# Patient Record
Sex: MALE | Race: WHITE | URBAN - METROPOLITAN AREA
[De-identification: names, ages, dates, MRNs, and addresses within clinical notes are randomized per-mention and may not be internally consistent; named-entity substitution may affect disease eponyms.]

---

## 2021-05-15 ENCOUNTER — INPATIENT (INPATIENT)
Facility: HOSPITAL | Age: 36
LOS: 4 days | Discharge: NURSING, REHAB OR LONG-TERM CARE | DRG: 897 | End: 2021-05-20
Attending: PSYCHIATRY & NEUROLOGY | Admitting: PSYCHIATRY & NEUROLOGY
Payer: MEDICAID

## 2021-05-15 VITALS
SYSTOLIC BLOOD PRESSURE: 135 MMHG | WEIGHT: 175.05 LBS | HEIGHT: 70 IN | RESPIRATION RATE: 18 BRPM | HEART RATE: 65 BPM | DIASTOLIC BLOOD PRESSURE: 90 MMHG | OXYGEN SATURATION: 94 % | TEMPERATURE: 98 F

## 2021-05-15 DIAGNOSIS — F16.929 HALLUCINOGEN USE, UNSPECIFIED WITH INTOXICATION, UNSPECIFIED: ICD-10-CM

## 2021-05-15 DIAGNOSIS — F19.959 OTHER PSYCHOACTIVE SUBSTANCE USE, UNSPECIFIED WITH PSYCHOACTIVE SUBSTANCE-INDUCED PSYCHOTIC DISORDER, UNSPECIFIED: ICD-10-CM

## 2021-05-15 DIAGNOSIS — F11.99 OPIOID USE, UNSPECIFIED WITH UNSPECIFIED OPIOID-INDUCED DISORDER: ICD-10-CM

## 2021-05-15 DIAGNOSIS — F41.9 ANXIETY DISORDER, UNSPECIFIED: ICD-10-CM

## 2021-05-15 DIAGNOSIS — F31.9 BIPOLAR DISORDER, UNSPECIFIED: ICD-10-CM

## 2021-05-15 DIAGNOSIS — R45.1 RESTLESSNESS AND AGITATION: ICD-10-CM

## 2021-05-15 DIAGNOSIS — Z78.1 PHYSICAL RESTRAINT STATUS: ICD-10-CM

## 2021-05-15 DIAGNOSIS — F29 UNSPECIFIED PSYCHOSIS NOT DUE TO A SUBSTANCE OR KNOWN PHYSIOLOGICAL CONDITION: ICD-10-CM

## 2021-05-15 DIAGNOSIS — F23 BRIEF PSYCHOTIC DISORDER: ICD-10-CM

## 2021-05-15 LAB
ANION GAP SERPL CALC-SCNC: 8 MMOL/L — SIGNIFICANT CHANGE UP (ref 5–17)
APAP SERPL-MCNC: <5 UG/ML — LOW (ref 10–30)
APPEARANCE UR: CLEAR — SIGNIFICANT CHANGE UP
BASOPHILS # BLD AUTO: 0.02 K/UL — SIGNIFICANT CHANGE UP (ref 0–0.2)
BASOPHILS NFR BLD AUTO: 0.2 % — SIGNIFICANT CHANGE UP (ref 0–2)
BILIRUB UR-MCNC: NEGATIVE — SIGNIFICANT CHANGE UP
BUN SERPL-MCNC: 11 MG/DL — SIGNIFICANT CHANGE UP (ref 7–23)
CALCIUM SERPL-MCNC: 9.2 MG/DL — SIGNIFICANT CHANGE UP (ref 8.4–10.5)
CHLORIDE SERPL-SCNC: 101 MMOL/L — SIGNIFICANT CHANGE UP (ref 96–108)
CO2 SERPL-SCNC: 31 MMOL/L — SIGNIFICANT CHANGE UP (ref 22–31)
COLOR SPEC: YELLOW — SIGNIFICANT CHANGE UP
CREAT SERPL-MCNC: 0.67 MG/DL — SIGNIFICANT CHANGE UP (ref 0.5–1.3)
DIFF PNL FLD: NEGATIVE — SIGNIFICANT CHANGE UP
EOSINOPHIL # BLD AUTO: 0.01 K/UL — SIGNIFICANT CHANGE UP (ref 0–0.5)
EOSINOPHIL NFR BLD AUTO: 0.1 % — SIGNIFICANT CHANGE UP (ref 0–6)
ETHANOL SERPL-MCNC: <10 MG/DL — SIGNIFICANT CHANGE UP (ref 0–10)
GLUCOSE SERPL-MCNC: 76 MG/DL — SIGNIFICANT CHANGE UP (ref 70–99)
GLUCOSE UR QL: NEGATIVE — SIGNIFICANT CHANGE UP
HCT VFR BLD CALC: 42.9 % — SIGNIFICANT CHANGE UP (ref 39–50)
HGB BLD-MCNC: 14.7 G/DL — SIGNIFICANT CHANGE UP (ref 13–17)
IMM GRANULOCYTES NFR BLD AUTO: 0.3 % — SIGNIFICANT CHANGE UP (ref 0–1.5)
KETONES UR-MCNC: ABNORMAL MG/DL
LEUKOCYTE ESTERASE UR-ACNC: NEGATIVE — SIGNIFICANT CHANGE UP
LYMPHOCYTES # BLD AUTO: 1.42 K/UL — SIGNIFICANT CHANGE UP (ref 1–3.3)
LYMPHOCYTES # BLD AUTO: 14.5 % — SIGNIFICANT CHANGE UP (ref 13–44)
MCHC RBC-ENTMCNC: 33 PG — SIGNIFICANT CHANGE UP (ref 27–34)
MCHC RBC-ENTMCNC: 34.3 GM/DL — SIGNIFICANT CHANGE UP (ref 32–36)
MCV RBC AUTO: 96.2 FL — SIGNIFICANT CHANGE UP (ref 80–100)
MONOCYTES # BLD AUTO: 0.69 K/UL — SIGNIFICANT CHANGE UP (ref 0–0.9)
MONOCYTES NFR BLD AUTO: 7.1 % — SIGNIFICANT CHANGE UP (ref 2–14)
NEUTROPHILS # BLD AUTO: 7.6 K/UL — HIGH (ref 1.8–7.4)
NEUTROPHILS NFR BLD AUTO: 77.8 % — HIGH (ref 43–77)
NITRITE UR-MCNC: NEGATIVE — SIGNIFICANT CHANGE UP
NRBC # BLD: 0 /100 WBCS — SIGNIFICANT CHANGE UP (ref 0–0)
PCP SPEC-MCNC: SIGNIFICANT CHANGE UP
PH UR: 6 — SIGNIFICANT CHANGE UP (ref 5–8)
PLATELET # BLD AUTO: 203 K/UL — SIGNIFICANT CHANGE UP (ref 150–400)
POTASSIUM SERPL-MCNC: 4.1 MMOL/L — SIGNIFICANT CHANGE UP (ref 3.5–5.3)
POTASSIUM SERPL-SCNC: 4.1 MMOL/L — SIGNIFICANT CHANGE UP (ref 3.5–5.3)
PROT UR-MCNC: ABNORMAL MG/DL
RBC # BLD: 4.46 M/UL — SIGNIFICANT CHANGE UP (ref 4.2–5.8)
RBC # FLD: 12.8 % — SIGNIFICANT CHANGE UP (ref 10.3–14.5)
SALICYLATES SERPL-MCNC: <0.3 MG/DL — LOW (ref 2.8–20)
SARS-COV-2 RNA SPEC QL NAA+PROBE: SIGNIFICANT CHANGE UP
SODIUM SERPL-SCNC: 140 MMOL/L — SIGNIFICANT CHANGE UP (ref 135–145)
SP GR SPEC: >=1.03 — SIGNIFICANT CHANGE UP (ref 1–1.03)
UROBILINOGEN FLD QL: 1 E.U./DL — SIGNIFICANT CHANGE UP
WBC # BLD: 9.77 K/UL — SIGNIFICANT CHANGE UP (ref 3.8–10.5)
WBC # FLD AUTO: 9.77 K/UL — SIGNIFICANT CHANGE UP (ref 3.8–10.5)

## 2021-05-15 PROCEDURE — 71045 X-RAY EXAM CHEST 1 VIEW: CPT | Mod: 26

## 2021-05-15 PROCEDURE — 93010 ELECTROCARDIOGRAM REPORT: CPT

## 2021-05-15 PROCEDURE — 99285 EMERGENCY DEPT VISIT HI MDM: CPT

## 2021-05-15 PROCEDURE — 90792 PSYCH DIAG EVAL W/MED SRVCS: CPT

## 2021-05-15 RX ORDER — CHLORPROMAZINE HCL 10 MG
25 TABLET ORAL ONCE
Refills: 0 | Status: COMPLETED | OUTPATIENT
Start: 2021-05-15 | End: 2021-05-15

## 2021-05-15 RX ORDER — SODIUM CHLORIDE 9 MG/ML
1000 INJECTION INTRAMUSCULAR; INTRAVENOUS; SUBCUTANEOUS ONCE
Refills: 0 | Status: COMPLETED | OUTPATIENT
Start: 2021-05-15 | End: 2021-05-15

## 2021-05-15 RX ORDER — QUETIAPINE FUMARATE 200 MG/1
50 TABLET, FILM COATED ORAL ONCE
Refills: 0 | Status: COMPLETED | OUTPATIENT
Start: 2021-05-15 | End: 2021-05-15

## 2021-05-15 RX ORDER — HALOPERIDOL DECANOATE 100 MG/ML
5 INJECTION INTRAMUSCULAR ONCE
Refills: 0 | Status: COMPLETED | OUTPATIENT
Start: 2021-05-15 | End: 2021-05-15

## 2021-05-15 RX ORDER — DIPHENHYDRAMINE HCL 50 MG
50 CAPSULE ORAL ONCE
Refills: 0 | Status: COMPLETED | OUTPATIENT
Start: 2021-05-15 | End: 2021-05-15

## 2021-05-15 RX ADMIN — Medication 2 MILLIGRAM(S): at 17:40

## 2021-05-15 RX ADMIN — HALOPERIDOL DECANOATE 5 MILLIGRAM(S): 100 INJECTION INTRAMUSCULAR at 19:40

## 2021-05-15 RX ADMIN — SODIUM CHLORIDE 1000 MILLILITER(S): 9 INJECTION INTRAMUSCULAR; INTRAVENOUS; SUBCUTANEOUS at 23:45

## 2021-05-15 RX ADMIN — Medication 2 MILLIGRAM(S): at 20:13

## 2021-05-15 RX ADMIN — HALOPERIDOL DECANOATE 5 MILLIGRAM(S): 100 INJECTION INTRAMUSCULAR at 17:40

## 2021-05-15 RX ADMIN — Medication 2 MILLIGRAM(S): at 19:41

## 2021-05-15 RX ADMIN — Medication 25 MILLIGRAM(S): at 23:46

## 2021-05-15 RX ADMIN — Medication 2 MILLIGRAM(S): at 20:50

## 2021-05-15 RX ADMIN — HALOPERIDOL DECANOATE 5 MILLIGRAM(S): 100 INJECTION INTRAMUSCULAR at 20:12

## 2021-05-15 RX ADMIN — Medication 2 MILLIGRAM(S): at 21:58

## 2021-05-15 RX ADMIN — Medication 2 MILLIGRAM(S): at 21:37

## 2021-05-15 RX ADMIN — QUETIAPINE FUMARATE 50 MILLIGRAM(S): 200 TABLET, FILM COATED ORAL at 19:11

## 2021-05-15 RX ADMIN — Medication 50 MILLIGRAM(S): at 20:13

## 2021-05-15 NOTE — ED PROVIDER NOTE - CONSTITUTIONAL, MLM
agitated appearing, awake, alert, oriented to person, uncooperative with other orientation questions. poor hygiene. verbally abusive to staff normal...

## 2021-05-15 NOTE — ED PROVIDER NOTE - PROGRESS NOTE DETAILS
more agitated again. standing at bedside, yelling/threatening to staff. refusing to get in bed. will remedicate with ativan/ haldol psych at bedside. would like to admit involuntarily. paperwork done. pt more agitated now. psych recommends another dose of ativan/haldol. will also add benadryl. restrain for safety of patient/ others. pt now restrained. more calm pt was never accepted by psych -- I was called by telepsych stating they will not accept pt - he required add'l sedation in ED-- psych has no staff to accept pt --and will require re-eval in am  -- called 7[facility] res to eval in light of no psych bed-- pt will req close 1-1 monitoring and card monitor and pulse ox in light of amount of sedation given

## 2021-05-15 NOTE — ED ADULT NURSE NOTE - OBJECTIVE STATEMENT
pt is a 34 y/o M, history of bipolar, reports that he used to be on Seroquel but not for years. pt arriving in ED for c/o nausea, and c/o "I feel like im being chased". pt Uncooperative with history/ physical exam, refusing to answer questions/ be examined. Visibly anxious and agitated. Reportedly said the police were after him, he was feeling electricity though body. Now says thoughts racing. Maybe hearing/seeing things. Not sleeping or eating recently. Refusing further questions. Denies SI or HI

## 2021-05-15 NOTE — BEHAVIORAL HEALTH ASSESSMENT NOTE - SUMMARY
36yo M, self-reported bipolar disorder history, likely substance abuse history per ISTOP and current tox positive for THC, methadone, and PCP, presents BIBS with disorganized c/o, paranoid ideation, PMA, agitation, and grossly psychotic process.  Pt unable to meaningfully answer most questions or show that he could function safely if discharged.  He had noted not eating or sleeping PTA.  He was agitated requiring three rounds of IM medications thus far, with little effect.  Sx likely associated with substances though underlying dillan with PF possible.  Psychotic sx are affecting his safe functioning, and are requiring inpatient treatment for safety and stabilization.  Could not agree for VOL.    -2PC completed by writer, ER Attg, and ER nurse manager  -1:1  -AWAITING COVID RESULT prior to admitting/orders for [facility]  -observe off substances, avoid additional medications if possible as substances clear (though may need PRN to treat agitation)  -repeat EKG tomorrow to monitor QTc per Rx received and tox +methadone

## 2021-05-15 NOTE — BEHAVIORAL HEALTH ASSESSMENT NOTE - NSBHCHARTREVIEWLAB_PSY_A_CORE FT
tox +THC, PCP, methadone                          14.7   9.77  )-----------( 203      ( 15 May 2021 18:21 )             42.9   05-15    140  |  101  |  11  ----------------------------<  76  4.1   |  31  |  0.67    Ca    9.2      15 May 2021 18:21    BAL <10    Urinalysis Basic - ( 15 May 2021 18:46 )    Color: Yellow / Appearance: Clear / SG: >=1.030 / pH: x  Gluc: x / Ketone: Trace mg/dL  / Bili: Negative / Urobili: 1.0 E.U./dL   Blood: x / Protein: Trace mg/dL / Nitrite: NEGATIVE   Leuk Esterase: NEGATIVE / RBC: < 5 /HPF / WBC < 5 /HPF   Sq Epi: x / Non Sq Epi: 0-5 /HPF / Bacteria: Present /HPF

## 2021-05-15 NOTE — ED PROVIDER NOTE - CLINICAL SUMMARY MEDICAL DECISION MAKING FREE TEXT BOX
agitated/ uncooperative, responding to internal stimuli. suspect component of substance abuse. 1:1 initiated. given ativan/ haldol. medical screening labs obtained. psych consulted. required redosing of meds x2 and restraint due to continued agitation. u tox positive for 3 substances including pcp. admit to psych for further management.

## 2021-05-15 NOTE — ED PROVIDER NOTE - TOBACCO USE
Never smoker Back pain, hx of DJD, was in a car for long period of time, has neuropathy and Parkinson dz, CT shows DJD of back and hips, no lytic lesions or mass in abdomen, will Rx Toradol and pain follow up with Dr [facility]

## 2021-05-15 NOTE — ED PROVIDER NOTE - OBJECTIVE STATEMENT
history of bipolar, reports used to be on seroquel but not for years, here reporting nausea, not feeling well. Uncooperative with history/ physical exam, refusing to answer questions/ be examined. Says he already said everything to the nurse and repeating it is making him agitated, he just calmed himself down. Reportedly said the police were after him, he was feeling electricity though body. Now says thoughts racing. Maybe hearing/seeing things. Not sleeping or eating recently. Refusing further questions

## 2021-05-15 NOTE — ED PROVIDER NOTE - NEUROLOGICAL, MLM
Alert and oriented to self. not cooperative with neuro exam but noted to be moving all extremities normally

## 2021-05-15 NOTE — BEHAVIORAL HEALTH ASSESSMENT NOTE - NSBHCHARTREVIEWVS_PSY_A_CORE FT
Vital Signs Last 24 Hrs  T(C): 36.8 (15 May 2021 17:19), Max: 36.8 (15 May 2021 17:19)  T(F): 98.2 (15 May 2021 17:19), Max: 98.2 (15 May 2021 17:19)  HR: 65 (15 May 2021 17:19) (65 - 65)  BP: 135/90 (15 May 2021 17:19) (135/90 - 135/90)  BP(mean): --  RR: 18 (15 May 2021 17:19) (18 - 18)  SpO2: 94% (15 May 2021 17:19) (94% - 94%)

## 2021-05-15 NOTE — ED PROVIDER NOTE - PATIENT IS MEDICALLY STABLE FOR PSYCHIATRIC EVALUATION ADMISSION, OR TRANSFER TO ANOTHER FACILITY
DM (diabetes mellitus)    High cholesterol    HTN (hypertension)    Prostate CA    Pulmonary hypertension    Renal insufficiency    Sleep apnea Statement Selected

## 2021-05-15 NOTE — BEHAVIORAL HEALTH ASSESSMENT NOTE - OTHER
intense unable mumbled threatening toward staff substance unknown deferred responding to internal stimuli substance abuse

## 2021-05-15 NOTE — ED ADULT TRIAGE NOTE - CHIEF COMPLAINT QUOTE
Pt states "I feel like I can't breath and I think they're following me and if you're gonna arrest me just do it now" and "It feels like there's lightening going through my body and I can't think straight."

## 2021-05-15 NOTE — BEHAVIORAL HEALTH ASSESSMENT NOTE - RISK ASSESSMENT
high risk for violence per threats, uncontrollable behavior, poor reality testing.  Does not express SI though risk is elevated, and harm to self including accidental is possible due to current condition. Moderate Acute Suicide Risk

## 2021-05-15 NOTE — BEHAVIORAL HEALTH ASSESSMENT NOTE - HPI (INCLUDE ILLNESS QUALITY, SEVERITY, DURATION, TIMING, CONTEXT, MODIFYING FACTORS, ASSOCIATED SIGNS AND SYMPTOMS)
34yo M hx bipolar disorder hx seroquel BIBS c/o difficulty breathing and "they're following me"... "if you're gonna arrest me just do it", c/o not thinking straight, racing thoughts, not sleeping or eating.  He required haldol 5mg IM and ativan 2mg IM for aggression toward staff, then took seroquel 50mg PO, then required another haldol 5mg IM and ativan 2mg IM for threatening staff.  He was refusing to answer many questions by ER staff.    Pt assessed at bedside; he was internally preoccupied, pacing, menacing in the ER, not willing to sit for interview and refusing to answer questions.  He was responding to internal stimuli and moving quickly.  He said "no no no not this again!" and walked away from writer.  Soon thereafter he was more agitated and ER medicated with haldol 5mg IM, ativan 2mg IM, and benadryl 50mg IM.  He had rapid speech when receiving injection with security present, speaking nonsensically.    No prior records in EMR  ISTOP Reference #: [id number]- suboxone last in 7/2020.

## 2021-05-15 NOTE — ED PROVIDER NOTE - CARE PLAN
Principal Discharge DX:	Acute psychosis  Secondary Diagnosis:	Polysubstance abuse   Principal Discharge DX:	Acute psychosis  Secondary Diagnosis:	Polysubstance abuse  Secondary Diagnosis:	PCP intoxication

## 2021-05-15 NOTE — ED PROVIDER NOTE - PSYCHIATRIC, MLM
Alert and oriented to person, agitated, uncooperative mood and affect, appears paranoid. no apparent risk to self or others.

## 2021-05-16 LAB
COVID-19 SPIKE DOMAIN AB INTERP: POSITIVE
COVID-19 SPIKE DOMAIN ANTIBODY RESULT: 20.8 U/ML — HIGH
SARS-COV-2 IGG+IGM SERPL QL IA: 20.8 U/ML — HIGH
SARS-COV-2 IGG+IGM SERPL QL IA: POSITIVE

## 2021-05-16 PROCEDURE — 99251: CPT | Mod: GC

## 2021-05-16 PROCEDURE — 99231 SBSQ HOSP IP/OBS SF/LOW 25: CPT

## 2021-05-16 RX ORDER — OLANZAPINE 15 MG/1
10 TABLET, FILM COATED ORAL ONCE
Refills: 0 | Status: COMPLETED | OUTPATIENT
Start: 2021-05-16 | End: 2021-05-17

## 2021-05-16 RX ORDER — RISPERIDONE 4 MG/1
1 TABLET ORAL
Refills: 0 | Status: DISCONTINUED | OUTPATIENT
Start: 2021-05-16 | End: 2021-05-18

## 2021-05-16 RX ORDER — OLANZAPINE 15 MG/1
10 TABLET, FILM COATED ORAL ONCE
Refills: 0 | Status: DISCONTINUED | OUTPATIENT
Start: 2021-05-16 | End: 2021-05-18

## 2021-05-16 RX ORDER — METHADONE HYDROCHLORIDE 40 MG/1
30 TABLET ORAL ONCE
Refills: 0 | Status: DISCONTINUED | OUTPATIENT
Start: 2021-05-16 | End: 2021-05-16

## 2021-05-16 RX ORDER — MAGNESIUM HYDROXIDE 400 MG/1
30 TABLET, CHEWABLE ORAL DAILY
Refills: 0 | Status: DISCONTINUED | OUTPATIENT
Start: 2021-05-16 | End: 2021-05-20

## 2021-05-16 RX ORDER — METHADONE HYDROCHLORIDE 40 MG/1
20 TABLET ORAL ONCE
Refills: 0 | Status: DISCONTINUED | OUTPATIENT
Start: 2021-05-16 | End: 2021-05-16

## 2021-05-16 RX ORDER — OLANZAPINE 15 MG/1
10 TABLET, FILM COATED ORAL EVERY 8 HOURS
Refills: 0 | Status: DISCONTINUED | OUTPATIENT
Start: 2021-05-16 | End: 2021-05-17

## 2021-05-16 RX ORDER — CLONAZEPAM 1 MG
1 TABLET ORAL THREE TIMES A DAY
Refills: 0 | Status: DISCONTINUED | OUTPATIENT
Start: 2021-05-16 | End: 2021-05-20

## 2021-05-16 RX ORDER — ACETAMINOPHEN 500 MG
650 TABLET ORAL EVERY 6 HOURS
Refills: 0 | Status: DISCONTINUED | OUTPATIENT
Start: 2021-05-16 | End: 2021-05-20

## 2021-05-16 RX ORDER — HALOPERIDOL DECANOATE 100 MG/ML
5 INJECTION INTRAMUSCULAR EVERY 6 HOURS
Refills: 0 | Status: DISCONTINUED | OUTPATIENT
Start: 2021-05-16 | End: 2021-05-17

## 2021-05-16 RX ORDER — MIRTAZAPINE 45 MG/1
7.5 TABLET, ORALLY DISINTEGRATING ORAL AT BEDTIME
Refills: 0 | Status: DISCONTINUED | OUTPATIENT
Start: 2021-05-16 | End: 2021-05-20

## 2021-05-16 RX ORDER — NICOTINE POLACRILEX 2 MG
2 GUM BUCCAL
Refills: 0 | Status: DISCONTINUED | OUTPATIENT
Start: 2021-05-16 | End: 2021-05-17

## 2021-05-16 RX ORDER — METHADONE HYDROCHLORIDE 40 MG/1
190 TABLET ORAL DAILY
Refills: 0 | Status: DISCONTINUED | OUTPATIENT
Start: 2021-05-17 | End: 2021-05-17

## 2021-05-16 RX ORDER — DIPHENHYDRAMINE HCL 50 MG
50 CAPSULE ORAL EVERY 6 HOURS
Refills: 0 | Status: DISCONTINUED | OUTPATIENT
Start: 2021-05-16 | End: 2021-05-20

## 2021-05-16 RX ADMIN — METHADONE HYDROCHLORIDE 30 MILLIGRAM(S): 40 TABLET ORAL at 11:26

## 2021-05-16 RX ADMIN — METHADONE HYDROCHLORIDE 20 MILLIGRAM(S): 40 TABLET ORAL at 14:26

## 2021-05-16 RX ADMIN — MIRTAZAPINE 7.5 MILLIGRAM(S): 45 TABLET, ORALLY DISINTEGRATING ORAL at 20:58

## 2021-05-16 RX ADMIN — Medication 2 MILLIGRAM(S): at 16:15

## 2021-05-16 RX ADMIN — OLANZAPINE 10 MILLIGRAM(S): 15 TABLET, FILM COATED ORAL at 18:29

## 2021-05-16 RX ADMIN — Medication 2 MILLIGRAM(S): at 10:10

## 2021-05-16 RX ADMIN — Medication 1 MILLIGRAM(S): at 20:58

## 2021-05-16 RX ADMIN — OLANZAPINE 10 MILLIGRAM(S): 15 TABLET, FILM COATED ORAL at 10:10

## 2021-05-16 RX ADMIN — Medication 2 MILLIGRAM(S): at 22:47

## 2021-05-16 RX ADMIN — Medication 50 MILLIGRAM(S): at 22:39

## 2021-05-16 RX ADMIN — RISPERIDONE 1 MILLIGRAM(S): 4 TABLET ORAL at 20:58

## 2021-05-16 RX ADMIN — Medication 1 MILLIGRAM(S): at 12:14

## 2021-05-16 RX ADMIN — HALOPERIDOL DECANOATE 5 MILLIGRAM(S): 100 INJECTION INTRAMUSCULAR at 22:40

## 2021-05-16 RX ADMIN — Medication 2 MILLIGRAM(S): at 22:39

## 2021-05-16 NOTE — CONSULT NOTE ADULT - ASSESSMENT
35M PMH bipolar disorder presents to the emergency room with shortness of breath, ICU consult called for possible need for telemetry given heavy sedation in ED      NEUROLOGIC  #Bipolar disorder  -As per psychiatry recommendations haldol 5mg PO/IM and ativan 2mg PO/IM q4h PRN for agitation  -Continue to follow up with psychiatry recommendations  -As per psychiatry, patient DOES NOT have decision making capacity, and is to be admitted to inpatient psychiatry under 2PC completed on 5/15 in ED    Dispo: LYLA 35M PMH bipolar disorder presents to the emergency room with shortness of breath, ICU consult called for possible need for telemetry given heavy sedation in ED. Upon examination, patient found resting comfortably, responsive to verbal stimuli, laying in bed off telemetry monitoring.       NEUROLOGIC  #Bipolar disorder  -As per psychiatry recommendations haldol 5mg PO/IM and ativan 2mg PO/IM q4h PRN for agitation  -Continue to follow up with psychiatry recommendations  -As per psychiatry, patient DOES NOT have decision making capacity, and is to be admitted to inpatient psychiatry under 2PC completed on 5/15 in ED    Dispo: LYLA 35M PMH bipolar disorder presents to the emergency room with shortness of breath, ICU consult called for possible need for telemetry given heavy sedation in ED. Upon examination, patient found resting comfortably, responsive to verbal stimuli, laying in bed off telemetry monitoring.       NEUROLOGIC  #Bipolar disorder  -As per psychiatry recommendations haldol 5mg PO/IM and ativan 2mg PO/IM q4h PRN for agitation  -Continue to follow up with psychiatry recommendations  -As per psychiatry, patient DOES NOT have decision making capacity, and is to be admitted to inpatient psychiatry under 2PC completed on 5/15 in ED  - Patient asleep at time of exam but is arousal, psychiatry to reassess patient in am    DISPO: ED, patient to reassess patient in AM, no need for ICU or telemetry unit

## 2021-05-16 NOTE — CONSULT NOTE ADULT - SUBJECTIVE AND OBJECTIVE BOX
[age]M PMH bipolar disorder presents to the emergency room complaining of shortness of breath and was found to have altered mental status. Patient non-cooperative with exam and interview. Reported abnormal thoughts and reporting visual and auditory hallucinations. Patient reported reduced sleeping and eating recently. Only significant positive lab was UTox positive for THC, PCP and methadone. Patient persistently agitated in ED and received ativan 2 mg IV x3 and IM x3, Seroquel 50 mg PO, haldol 5 mg IV x 3, diphenhydramine 50 mg IV x 1, Thorazine 25 mg 1x. ICU consult placed due to over sedation and possible need for telemetry. Patient saturating well on telemetry in ED.  35M PMH bipolar disorder presents to the emergency room complaining of shortness of breath and was found to have altered mental status. Patient non-cooperative with exam and interview. Reported abnormal thoughts and reporting visual and auditory hallucinations. Patient reported reduced sleeping and eating recently. Only significant positive lab was UTox positive for THC, PCP and methadone. Patient persistently agitated in ED and received ativan 2 mg IV x3 and IM x3, Seroquel 50 mg PO, haldol 5 mg IV x 3, diphenhydramine 50 mg IV x 1, Thorazine 25 mg 1x. ICU consult placed due to over sedation and possible need for telemetry. Patient saturating well on telemetry in ED.     VITAL SIGNS:  Vital Signs Last 24 Hrs  T(C): 36.8 (15 May 2021 17:19), Max: 36.8 (15 May 2021 17:19)  T(F): 98.2 (15 May 2021 17:19), Max: 98.2 (15 May 2021 17:19)  HR: 67 (16 May 2021 02:00) (65 - 160)  BP: 121/84 (16 May 2021 02:00) (121/84 - 198/100)  BP(mean): --  RR: 18 (16 May 2021 02:00) (18 - 26)  SpO2: 98% (16 May 2021 02:00) (94% - 99%)    PHYSICAL EXAM:  General: WDWN  HEENT: NC/AT; PERRL, anicteric sclera; MMM  Neck: supple  Cardiovascular: +S1/S2; RRR  Respiratory: CTA B/L; no W/R/R  Gastrointestinal: soft, NT/ND; +BSx4  Extremities: WWP; no edema, clubbing or cyanosis  Vascular: 2+ radial, DP/PT pulses B/L  Neurological: AAOx3; no focal deficits    MEDICATIONS:  MEDICATIONS  (STANDING):  OLANZapine Injectable 10 milliGRAM(s) IntraMuscular once  sodium chloride 0.9% Bolus 1000 milliLiter(s) IV Bolus once    MEDICATIONS  (PRN):      ALLERGIES:  Allergies    No Known Allergies    Intolerances        LABS:                        14.7   9.77  )-----------( 203      ( 15 May 2021 18:21 )             42.9     05-15    140  |  101  |  11  ----------------------------<  76  4.1   |  31  |  0.67    Ca    9.2      15 May 2021 18:21        Urinalysis Basic - ( 15 May 2021 18:46 )    Color: Yellow / Appearance: Clear / SG: >=1.030 / pH: x  Gluc: x / Ketone: Trace mg/dL  / Bili: Negative / Urobili: 1.0 E.U./dL   Blood: x / Protein: Trace mg/dL / Nitrite: NEGATIVE   Leuk Esterase: NEGATIVE / RBC: < 5 /HPF / WBC < 5 /HPF   Sq Epi: x / Non Sq Epi: 0-5 /HPF / Bacteria: Present /HPF      CAPILLARY BLOOD GLUCOSE          RADIOLOGY & ADDITIONAL TESTS: Reviewed.   35M PMH bipolar disorder presents to the emergency room complaining of shortness of breath and was found to have altered mental status. Patient non-cooperative with exam and interview. Reported abnormal thoughts and reporting visual and auditory hallucinations. Patient reported reduced sleeping and eating recently. Only significant positive lab was UTox positive for THC, PCP and methadone. Patient persistently agitated in ED and received ativan 2 mg IV x3 and IM x3, Seroquel 50 mg PO, haldol 5 mg IV x 3, diphenhydramine 50 mg IV x 1, Thorazine 25 mg 1x. ICU consult placed due to over sedation and possible need for telemetry. Patient saturating well on telemetry in ED.     VITAL SIGNS:  Vital Signs Last 24 Hrs  T(C): 36.8 (15 May 2021 17:19), Max: 36.8 (15 May 2021 17:19)  T(F): 98.2 (15 May 2021 17:19), Max: 98.2 (15 May 2021 17:19)  HR: 67 (16 May 2021 02:00) (65 - 160)  BP: 121/84 (16 May 2021 02:00) (121/84 - 198/100)  BP(mean): --  RR: 18 (16 May 2021 02:00) (18 - 26)  SpO2: 98% (16 May 2021 02:00) (94% - 99%)    PHYSICAL EXAM:  General: WDWN  HEENT: NC/AT; PERRL, anicteric sclera; MMM  Neck: no JVD  Cardiovascular: +S1/S2; RRR  Respiratory: CTA B/L; no W/R/R, no cyanosis or accessory muscle use   Gastrointestinal: soft, NT/ND; +BSx4  Extremities: WWP; no edema, clubbing or cyanosis  Neurological: opens eyes to verbal stimuli and tracks, does not answer questions when asked    MEDICATIONS:  MEDICATIONS  (STANDING):  OLANZapine Injectable 10 milliGRAM(s) IntraMuscular once  sodium chloride 0.9% Bolus 1000 milliLiter(s) IV Bolus once    MEDICATIONS  (PRN):    ALLERGIES: NKDA    LABS:                        14.7   9.77  )-----------( 203      ( 15 May 2021 18:21 )             42.9     05-15    140  |  101  |  11  ----------------------------<  76  4.1   |  31  |  0.67    Ca    9.2      15 May 2021 18:21        Urinalysis Basic - ( 15 May 2021 18:46 )    Color: Yellow / Appearance: Clear / SG: >=1.030 / pH: x  Gluc: x / Ketone: Trace mg/dL  / Bili: Negative / Urobili: 1.0 E.U./dL   Blood: x / Protein: Trace mg/dL / Nitrite: NEGATIVE   Leuk Esterase: NEGATIVE / RBC: < 5 /HPF / WBC < 5 /HPF   Sq Epi: x / Non Sq Epi: 0-5 /HPF / Bacteria: Present /HPF      RADIOLOGY & ADDITIONAL TESTS: Reviewed.

## 2021-05-16 NOTE — ED BEHAVIORAL HEALTH NOTE - BEHAVIORAL HEALTH NOTE
35 year old male with known history of SUDs consistent with utox including Methadone, PCP and THC re-evaluated and determined to continue to require and meet criteria for involuntary admission to [facility] (PCR negative).  Patient continues to display hyperactivity, responding to internal stimuli and disorganized thought process.  Patient is no longer agitated and remains on 1:1 in ED awaiting transfer to [facility] where patient will be continued on 1:1 for safety and unpredictable behaviors.  Part B signed by this writer.

## 2021-05-16 NOTE — ED ADULT NURSE REASSESSMENT NOTE - NS ED NURSE REASSESS COMMENT FT1
Continuous close 1:1 observation and monitoring of pt maintained, NAD noted, pt resting comfortably, waiting psychiatric re-evaluation in morning
Continuous monitoring and close 1:1 observation of pt maintained, NAD noted, pt sleeping, airway patent, respirations regular, non-labored. Pt waiting admission bed assingment
Continuous monitoring and close 1:1 observation of pt maintained, pt continues to be agitated, unable to redirect. Orders received, pt medicated per MAR. Attempt made to call report to floor, informed bed not clean at this time.
Pt continues to thrash about, unable to redirect, verbal order received for ativan 1mg IVP, medication administered, Continuous monitoring and close 1:1 observation of pt maintained.
Pt pacing around room, attempting to leave, unable to redirect, security at bedside, pt placed in stretcher, four point restraints placed for pt/staff safety, ER physician at bedside. IV access established, verbal order received for ativan 1mg IVP, medication administered, pt placed on cardiac monitor, NIBP and SpO2. Continuous close 1:1 observation of pt maintained. Pt waiting admission bed assignment
Pt remains on constant observation. Pt currently alert and follows commands and will answer some questions but remains disoriented and is mumbling and talking to himself. Psych MD Dr. Dalton was at bedside and plan is for psych admission.
Report received from Hussein SOLORIO, will assume care of pt at this time. Pt placed in ER room 8, continuous close 1:1 observation of pt maintained, pt waiting admission bed assignment
patient wanded, belongings secured in cabinet. 1:1 observer in place
pt became increasingly anxious and agitated. Dr. Myers aware, pt given second dose of Ativan and Haldol IM, awaiting labs and dispo.

## 2021-05-16 NOTE — CONSULT NOTE ADULT - ATTENDING COMMENTS
This patient was evaluated with the resident, management decisions made, see above for the details, I agree with the A/P.  -Bipolar disorder, f/u with Psychiatry, d/w ED physician, no need for ICU admission as the patient is A/O x3, calm and comfortable.  The patient will be seen by psychiatry.

## 2021-05-16 NOTE — CHART NOTE - NSCHARTNOTEFT_GEN_A_CORE
Admit to Psychiatry order had been placed in error earlier this evening as pt did not have COVID etc resulted as needed for psych admission.  Over the evening thereafter pt continued to be treated by ER for agitation ordering IMs, restraints.  VS elevated.  d/w Dr Escobedo following ER shift change- pt had not been signed out to him initially, he will continue to treat and once stable for transfer to [facility] will call telepsychiatry for admission orders.

## 2021-05-17 LAB
CHOLEST SERPL-MCNC: 131 MG/DL — SIGNIFICANT CHANGE UP
HDLC SERPL-MCNC: 62 MG/DL — SIGNIFICANT CHANGE UP
LIPID PNL WITH DIRECT LDL SERPL: 57 MG/DL — SIGNIFICANT CHANGE UP
NON HDL CHOLESTEROL: 69 MG/DL — SIGNIFICANT CHANGE UP
TRIGL SERPL-MCNC: 62 MG/DL — SIGNIFICANT CHANGE UP

## 2021-05-17 PROCEDURE — 99233 SBSQ HOSP IP/OBS HIGH 50: CPT

## 2021-05-17 RX ORDER — METHADONE HYDROCHLORIDE 40 MG/1
160 TABLET ORAL DAILY
Refills: 0 | Status: DISCONTINUED | OUTPATIENT
Start: 2021-05-17 | End: 2021-05-17

## 2021-05-17 RX ORDER — METHADONE HYDROCHLORIDE 40 MG/1
190 TABLET ORAL DAILY
Refills: 0 | Status: DISCONTINUED | OUTPATIENT
Start: 2021-05-17 | End: 2021-05-20

## 2021-05-17 RX ORDER — HALOPERIDOL DECANOATE 100 MG/ML
10 INJECTION INTRAMUSCULAR EVERY 6 HOURS
Refills: 0 | Status: DISCONTINUED | OUTPATIENT
Start: 2021-05-17 | End: 2021-05-18

## 2021-05-17 RX ORDER — NICOTINE POLACRILEX 2 MG
1 GUM BUCCAL DAILY
Refills: 0 | Status: DISCONTINUED | OUTPATIENT
Start: 2021-05-17 | End: 2021-05-20

## 2021-05-17 RX ADMIN — Medication 1 MILLIGRAM(S): at 13:46

## 2021-05-17 RX ADMIN — Medication 50 MILLIGRAM(S): at 10:21

## 2021-05-17 RX ADMIN — Medication 2 MILLIGRAM(S): at 10:22

## 2021-05-17 RX ADMIN — Medication 2 MILLIGRAM(S): at 00:10

## 2021-05-17 RX ADMIN — HALOPERIDOL DECANOATE 10 MILLIGRAM(S): 100 INJECTION INTRAMUSCULAR at 10:21

## 2021-05-17 RX ADMIN — OLANZAPINE 10 MILLIGRAM(S): 15 TABLET, FILM COATED ORAL at 00:10

## 2021-05-17 RX ADMIN — METHADONE HYDROCHLORIDE 190 MILLIGRAM(S): 40 TABLET ORAL at 11:45

## 2021-05-17 RX ADMIN — Medication 2 MILLIGRAM(S): at 03:29

## 2021-05-17 RX ADMIN — Medication 2 MILLIGRAM(S): at 07:05

## 2021-05-17 NOTE — PROGRESS NOTE BEHAVIORAL HEALTH - NSBHFUPINTERVALHXFT_PSY_A_CORE
Patient seen and evaluated with attending psychiatrist and medical student in the presence of PCA. Patient observed to be pacing room, grossly disorganized, mumbling incomprehensibly to himself. When asked about events leading to hospitalization patient makes few comprehensible statements but becomes tearful as he tells writer that "I've been homeless for 14 months". Patient requires multiple STAT IM medication for agitation and safety throughout his hospital stay thus far. Patient also maintained on 1:1 for safety. Patient again interviewed and is noted to be oriented to person but not to hospital, date, or year. Patient noted to exhibit chills and claims to staff to be experiencing perceptual disturbances, including VH. Patient unable to cooperate with interview at this time.

## 2021-05-17 NOTE — PROGRESS NOTE BEHAVIORAL HEALTH - SUMMARY
36yo M, self-reported bipolar disorder history, likely substance abuse history per ISTOP and current tox positive for THC, methadone, and PCP, presents BIBS with disorganized c/o, paranoid ideation, PMA, agitation, and grossly psychotic process.  Pt unable to meaningfully answer most questions or show that he could function safely if discharged.  He had noted not eating or sleeping PTA.  He was agitated requiring three rounds of IM medications thus far, with little effect.  Sx likely associated with substances though underlying dillan with PF possible.  Psychotic sx are affecting his safe functioning, and are requiring inpatient treatment for safety and stabilization.  Could not agree for VOL.    5/17: Patient grossly disorganized and unable to cooperate with exam. Patient not able to answer basic orientation questions and required multiple STAT IM medications for agitation. Patient remains on 1:1 for safety.    1) c/w risperdal 1 mg PO BID for psychosis  2) Methadone 190 mg PO daily for opiate use disorder  3) Haldol 5 mg and ativan 2 mg q6h prn agitation  4) Maintain 1:1 for agitation and disorganization  5) Attempt to interview tomorrow and gain collateral

## 2021-05-18 DIAGNOSIS — F24 SHARED PSYCHOTIC DISORDER: ICD-10-CM

## 2021-05-18 LAB
A1C WITH ESTIMATED AVERAGE GLUCOSE RESULT: 5 % — SIGNIFICANT CHANGE UP (ref 4–5.6)
ESTIMATED AVERAGE GLUCOSE: 97 MG/DL — SIGNIFICANT CHANGE UP (ref 68–114)

## 2021-05-18 PROCEDURE — 99233 SBSQ HOSP IP/OBS HIGH 50: CPT

## 2021-05-18 RX ORDER — HALOPERIDOL DECANOATE 100 MG/ML
5 INJECTION INTRAMUSCULAR EVERY 6 HOURS
Refills: 0 | Status: DISCONTINUED | OUTPATIENT
Start: 2021-05-18 | End: 2021-05-19

## 2021-05-18 RX ORDER — NICOTINE POLACRILEX 2 MG
2 GUM BUCCAL
Refills: 0 | Status: DISCONTINUED | OUTPATIENT
Start: 2021-05-18 | End: 2021-05-20

## 2021-05-18 RX ADMIN — Medication 1 MILLIGRAM(S): at 21:31

## 2021-05-18 RX ADMIN — METHADONE HYDROCHLORIDE 190 MILLIGRAM(S): 40 TABLET ORAL at 10:54

## 2021-05-18 RX ADMIN — Medication 1 PATCH: at 09:26

## 2021-05-18 RX ADMIN — Medication 1 MILLIGRAM(S): at 15:12

## 2021-05-18 RX ADMIN — RISPERIDONE 1 MILLIGRAM(S): 4 TABLET ORAL at 10:56

## 2021-05-18 RX ADMIN — Medication 2 MILLIGRAM(S): at 23:55

## 2021-05-18 RX ADMIN — MIRTAZAPINE 7.5 MILLIGRAM(S): 45 TABLET, ORALLY DISINTEGRATING ORAL at 21:32

## 2021-05-18 RX ADMIN — Medication 1 PATCH: at 18:37

## 2021-05-18 RX ADMIN — Medication 2 MILLIGRAM(S): at 18:36

## 2021-05-18 RX ADMIN — Medication 1 MILLIGRAM(S): at 09:27

## 2021-05-18 NOTE — PROGRESS NOTE BEHAVIORAL HEALTH - NSBHFUPINTERVALHXFT_PSY_A_CORE
Patient interviewed on unit in presence of PCA. Patient observed to be resting comfortably on entry to room. Patient easily arousable and is oriented to person, hospital, month, president, and year. Patient tells writer "I was not in a good place before ending up here. I smoked a lot of K2 for, like, 2 days before. I was losing my mind! I thought that these people were after me. I thought these government agents were following me on the street, morphing into other people, and chasing me all over. I don't remember much but I went to the hospital because I knew something was wrong." In addition to K2 use patient endorses using "multiple bags of heroin on top of my methadone." Patient states he takes 190 mg MMT. Denies hx of mental illness but endorses multiple detox/rehab visits, most recently 6 months ago. he tells writer that "I want to try it again, I can't keep living life like this. I've been street homeless for 28 months at this point and I need to make a change." Patient denies current perceptual disturbances and SI/HI. Endorses a return in appetite and patient was able to sleep through the night. States his mood is "more stable now". denies feelings of hopelessness/helplessness, guilt, low mood. No overtly psychotic, paranoid, or grandiose statements made during interview. Patient interviewed on unit in presence of PCA. Patient observed to be resting comfortably on entry to room. Patient easily arousable and is oriented to person, hospital, month, president, and year. Patient tells writer "I was not in a good place before ending up here. I smoked a lot of K2 for, like, 2 days before. I was losing my mind! I thought that these people were after me. I thought these government agents were following me on the street, morphing into other people, and chasing me all over. I don't remember much but I went to the hospital because I knew something was wrong." In addition to K2 use patient endorses using "multiple bags of heroin on top of my methadone." Patient states he takes 190 mg MMT. Denies hx of mental illness but endorses multiple detox/rehab visits, most recently 6 months ago. he tells writer that "I want to try it again, I can't keep living life like this. I've been street homeless for 28 months at this point and I need to make a change." Patient denies current perceptual disturbances and SI/HI. Endorses a return in appetite and patient was able to sleep through the night. States his mood is "more stable now". denies feelings of hopelessness/helplessness, guilt, low mood. No overtly psychotic, paranoid, or grandiose statements made during interview.    Psyckes reviewed: Patient carries diagnoses of opiate use d/o and EtOH use disorder but no history of mood or psychotic illness appreciated. Patient only prescribed methadone currently and history is notable for two rehab stints, most recently in August of 2020 at [facility].

## 2021-05-18 NOTE — PROGRESS NOTE BEHAVIORAL HEALTH - NSBHADMITCOUNSELOTHER_PSY_A_CORE FT
Discussed his substance use, provided motivational interviewing, and how expensive his heroin habit has been (bundle a day).

## 2021-05-18 NOTE — PROGRESS NOTE BEHAVIORAL HEALTH - NSBHADMITCOUNSEL_PSY_A_CORE
diagnostic results/impressions and/or recommended studies/risks and benefits of treatment options/instructions for management, treatment and follow up/importance of adherence to chosen treatment/risk factor reduction/client/family/caregiver education/prognosis/other...
instructions for management, treatment and follow up

## 2021-05-18 NOTE — PROGRESS NOTE BEHAVIORAL HEALTH - SUMMARY
34yo M, self-reported bipolar disorder history, likely substance abuse history per ISTOP and current tox positive for THC, methadone, and PCP, presents BIBS with disorganized c/o, paranoid ideation, PMA, agitation, and grossly psychotic process.  Pt unable to meaningfully answer most questions or show that he could function safely if discharged.  He had noted not eating or sleeping PTA.  He was agitated requiring three rounds of IM medications thus far, with little effect.  Sx likely associated with substances though underlying dillan with PF possible.  Psychotic sx are affecting his safe functioning, and are requiring inpatient treatment for safety and stabilization.  Could not agree for VOL.    5/17: Patient grossly disorganized and unable to cooperate with exam. Patient not able to answer basic orientation questions and required multiple STAT IM medications for agitation. Patient remains on 1:1 for safety.    5/18: Patient now alert and oriented, contracts for safety. Able to remove CO. Denies hx of mental illness.     1) d/c risperdal 1 mg PO BID  2) Methadone 190 mg PO daily for opiate use disorder  3) Haldol 5 mg and ativan 2 mg q6h prn agitation  4) Discontinue 1:1 for agitation and disorganization as patient is now in behavioral control  5) Clonazepam 1 mg TID for anxiety 36yo M, self-reported bipolar disorder history, likely substance abuse history per ISTOP and current tox positive for THC, methadone, and PCP, presents BIBS with disorganized c/o, paranoid ideation, PMA, agitation, and grossly psychotic process.  Pt unable to meaningfully answer most questions or show that he could function safely if discharged.  He had noted not eating or sleeping PTA.  He was agitated requiring three rounds of IM medications thus far, with little effect.  Sx likely associated with substances though underlying dillan with PF possible.  Psychotic sx are affecting his safe functioning, and are requiring inpatient treatment for safety and stabilization.  Could not agree for VOL.    5/17: Patient grossly disorganized and unable to cooperate with exam. Patient not able to answer basic orientation questions and required multiple STAT IM medications for agitation. Patient remains on 1:1 for safety.    5/18: Patient now alert and oriented, contracts for safety. Able to remove CO. Denies hx of mental illness and no longer appears to be delirious; no longer requires treatment is with an antipsychotic.     1) d/c risperdal 1 mg PO BID  2) Methadone 190 mg PO daily for opiate use disorder  3) Haldol 5 mg and ativan 2 mg q6h prn agitation  4) Discontinue 1:1 for agitation and disorganization as patient is now in behavioral control  5) Clonazepam 1 mg TID for anxiety, consider tapering tomorrow

## 2021-05-18 NOTE — PROGRESS NOTE BEHAVIORAL HEALTH - NSBHCHARTREVIEWVS_PSY_A_CORE FT
Vital Signs Last 24 Hrs  T(C): 36.8 (15 May 2021 17:19), Max: 36.8 (15 May 2021 17:19)  T(F): 98.2 (15 May 2021 17:19), Max: 98.2 (15 May 2021 17:19)  HR: 65 (15 May 2021 17:19) (65 - 65)  BP: 135/90 (15 May 2021 17:19) (135/90 - 135/90)  BP(mean): --  RR: 18 (15 May 2021 17:19) (18 - 18)  SpO2: 94% (15 May 2021 17:19) (94% - 94%)
Vital Signs Last 24 Hrs  T(C): 36.8 (15 May 2021 17:19), Max: 36.8 (15 May 2021 17:19)  T(F): 98.2 (15 May 2021 17:19), Max: 98.2 (15 May 2021 17:19)  HR: 65 (15 May 2021 17:19) (65 - 65)  BP: 135/90 (15 May 2021 17:19) (135/90 - 135/90)  BP(mean): --  RR: 18 (15 May 2021 17:19) (18 - 18)  SpO2: 94% (15 May 2021 17:19) (94% - 94%)

## 2021-05-18 NOTE — PROGRESS NOTE BEHAVIORAL HEALTH - NSBHCHARTREVIEWLAB_PSY_A_CORE FT
tox +THC, PCP, methadone                          14.7   9.77  )-----------( 203      ( 15 May 2021 18:21 )             42.9   05-15    140  |  101  |  11  ----------------------------<  76  4.1   |  31  |  0.67    Ca    9.2      15 May 2021 18:21    BAL <10    Urinalysis Basic - ( 15 May 2021 18:46 )    Color: Yellow / Appearance: Clear / SG: >=1.030 / pH: x  Gluc: x / Ketone: Trace mg/dL  / Bili: Negative / Urobili: 1.0 E.U./dL   Blood: x / Protein: Trace mg/dL / Nitrite: NEGATIVE   Leuk Esterase: NEGATIVE / RBC: < 5 /HPF / WBC < 5 /HPF   Sq Epi: x / Non Sq Epi: 0-5 /HPF / Bacteria: Present /HPF
tox +THC, PCP, methadone                          14.7   9.77  )-----------( 203      ( 15 May 2021 18:21 )             42.9   05-15    140  |  101  |  11  ----------------------------<  76  4.1   |  31  |  0.67    Ca    9.2      15 May 2021 18:21    BAL <10    Urinalysis Basic - ( 15 May 2021 18:46 )    Color: Yellow / Appearance: Clear / SG: >=1.030 / pH: x  Gluc: x / Ketone: Trace mg/dL  / Bili: Negative / Urobili: 1.0 E.U./dL   Blood: x / Protein: Trace mg/dL / Nitrite: NEGATIVE   Leuk Esterase: NEGATIVE / RBC: < 5 /HPF / WBC < 5 /HPF   Sq Epi: x / Non Sq Epi: 0-5 /HPF / Bacteria: Present /HPF

## 2021-05-19 DIAGNOSIS — F19.20 OTHER PSYCHOACTIVE SUBSTANCE DEPENDENCE, UNCOMPLICATED: ICD-10-CM

## 2021-05-19 DIAGNOSIS — F11.99 OPIOID USE, UNSPECIFIED WITH UNSPECIFIED OPIOID-INDUCED DISORDER: ICD-10-CM

## 2021-05-19 PROCEDURE — 99232 SBSQ HOSP IP/OBS MODERATE 35: CPT

## 2021-05-19 RX ORDER — HALOPERIDOL DECANOATE 100 MG/ML
5 INJECTION INTRAMUSCULAR EVERY 8 HOURS
Refills: 0 | Status: DISCONTINUED | OUTPATIENT
Start: 2021-05-19 | End: 2021-05-20

## 2021-05-19 RX ADMIN — Medication 1 PATCH: at 05:03

## 2021-05-19 RX ADMIN — Medication 50 MILLIGRAM(S): at 22:05

## 2021-05-19 RX ADMIN — Medication 1 MILLIGRAM(S): at 10:36

## 2021-05-19 RX ADMIN — Medication 1 MILLIGRAM(S): at 16:25

## 2021-05-19 RX ADMIN — Medication 1 MILLIGRAM(S): at 22:00

## 2021-05-19 RX ADMIN — Medication 1 PATCH: at 10:32

## 2021-05-19 RX ADMIN — Medication 2 MILLIGRAM(S): at 05:00

## 2021-05-19 RX ADMIN — MIRTAZAPINE 7.5 MILLIGRAM(S): 45 TABLET, ORALLY DISINTEGRATING ORAL at 22:02

## 2021-05-19 RX ADMIN — METHADONE HYDROCHLORIDE 190 MILLIGRAM(S): 40 TABLET ORAL at 10:35

## 2021-05-19 RX ADMIN — Medication 2 MILLIGRAM(S): at 18:00

## 2021-05-19 NOTE — PROGRESS NOTE BEHAVIORAL HEALTH - RISK ASSESSMENT
Patient now alert and oriented, contracts for safety, no longer requires CO
Patient at elevated risk for agitation due to disorganization and substance use. Patient should be maintained on constant observation for safety.
Patient now alert and oriented, contracts for safety, no longer requires CO

## 2021-05-19 NOTE — PROGRESS NOTE BEHAVIORAL HEALTH - NSBHFUPINTERVALCCFT_PSY_A_CORE
"I'm feeling much better doc."
"I've been on the street for 14 months."
"I'm good bro, i'm sorry about acting so crazy when I got here, I completely blacked out because I did so much K2."

## 2021-05-19 NOTE — PROGRESS NOTE BEHAVIORAL HEALTH - CASE SUMMARY
Motivated for rehab. No longer psychotic or delirious.
Substance induced psychosis a certainty. Suspect he may not have schizophrenia or bipolar or anything like that. K2 and PCP induced psychosis. Pt says he has used crystal meth in the past but not recently.   Pt interested in rehab.
Pt pulled out drain from shower in his room. Continue 1:1. Pt internally preoccupied, disorganized in thought process and behavior, and very irritable.

## 2021-05-19 NOTE — PROGRESS NOTE BEHAVIORAL HEALTH - NSBHFUPDXUPDATE_PSY_A_CORE
DEAR NAVA TIAN,      KRISTEL COOLEY IS IN NEED OF THE FOLLOWING MEDICAL SUPPLIES ON A DAILY BASIS:    DEPENDS ADULT UNDERGARMENTS, GOLD BOND CREME, WET WIPES, LARGE GISSEL POISE PADS,BABY POWDER  Electronically signed by: Roxana Carreno DO  Jan 26 2016  2:38PM EST yes
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
## 2021-05-19 NOTE — PROGRESS NOTE BEHAVIORAL HEALTH - NSBHCONSULTMEDAGITATION_PSY_A_CORE FT
recommend haldol 5mg PO/IM and ativan 2mg PO/IM q8h PRN  agitation
recommend haldol 5mg PO/IM and ativan 2mg PO/IM q4h PRN  agitation
recommend haldol 5mg PO/IM and ativan 2mg PO/IM q4h PRN  agitation

## 2021-05-19 NOTE — PROGRESS NOTE BEHAVIORAL HEALTH - SUMMARY
34yo M, self-reported bipolar disorder history, likely substance abuse history per ISTOP and current tox positive for THC, methadone, and PCP, presents BIBS with disorganized c/o, paranoid ideation, PMA, agitation, and grossly psychotic process.  Pt unable to meaningfully answer most questions or show that he could function safely if discharged.  He had noted not eating or sleeping PTA.  He was agitated requiring three rounds of IM medications thus far, with little effect.  Sx likely associated with substances though underlying dillan with PF possible.  Psychotic sx are affecting his safe functioning, and are requiring inpatient treatment for safety and stabilization.  Could not agree for VOL.    5/17: Patient grossly disorganized and unable to cooperate with exam. Patient not able to answer basic orientation questions and required multiple STAT IM medications for agitation. Patient remains on 1:1 for safety.    5/18: Patient now alert and oriented, contracts for safety. Able to remove CO. Denies hx of mental illness and no longer appears to be delirious; no longer requires treatment is with an antipsychotic.     5/19: Patient amenable to decreasing benzodiazapine usage while on unit and is motivated to go to inpatient rehab.    1) d/c risperdal 1 mg PO BID  2) Methadone 190 mg PO daily for opiate use disorder  3) Decrease frequency of Haldol 5 mg and ativan 2 mg from q6h to q8h prn agitation  4) Discontinue 1:1 for agitation and disorganization as patient is now in behavioral control  5) Clonazepam 1 mg TID for anxiety, consider tapering 34yo M, self-reported bipolar disorder history, likely substance abuse history per ISTOP and current tox positive for THC, methadone, and PCP, presents BIBS with disorganized c/o, paranoid ideation, PMA, agitation, and grossly psychotic process.  Pt unable to meaningfully answer most questions or show that he could function safely if discharged.  He had noted not eating or sleeping PTA.  He was agitated requiring three rounds of IM medications thus far, with little effect.  Sx likely associated with substances though underlying dillan with PF possible.  Psychotic sx are affecting his safe functioning, and are requiring inpatient treatment for safety and stabilization.  Could not agree for VOL.    5/17: Patient grossly disorganized and unable to cooperate with exam. Patient not able to answer basic orientation questions and required multiple STAT IM medications for agitation. Patient remains on 1:1 for safety.    5/18: Patient now alert and oriented, contracts for safety. Able to remove CO. Denies hx of mental illness and no longer appears to be delirious; no longer requires treatment is with an antipsychotic.     5/19: Patient amenable to decreasing benzodiazapine usage while on unit and is motivated to go to inpatient rehab. Patient endorses frequent IV drug use and agrees to testing for HIV and hepatitis.     1) d/c risperdal 1 mg PO BID  2) Methadone 190 mg PO daily for opiate use disorder  3) Decrease frequency of Haldol 5 mg and ativan 2 mg from q6h to q8h prn agitation  4) Discontinue 1:1 for agitation and disorganization as patient is now in behavioral control  5) Clonazepam 1 mg TID for anxiety, consider tapering  6) HIV and hepatitis panel for AM labs

## 2021-05-19 NOTE — PROGRESS NOTE BEHAVIORAL HEALTH - PRIMARY DX
Induced psychosis or paranoid disorder
Psychosis, unspecified psychosis type
Induced psychosis or paranoid disorder

## 2021-05-20 VITALS
TEMPERATURE: 98 F | DIASTOLIC BLOOD PRESSURE: 74 MMHG | HEART RATE: 60 BPM | OXYGEN SATURATION: 100 % | RESPIRATION RATE: 20 BRPM | SYSTOLIC BLOOD PRESSURE: 123 MMHG

## 2021-05-20 LAB
HAV IGM SER-ACNC: SIGNIFICANT CHANGE UP
HBV CORE AB SER-ACNC: SIGNIFICANT CHANGE UP
HBV CORE IGM SER-ACNC: SIGNIFICANT CHANGE UP
HBV SURFACE AB SER-ACNC: SIGNIFICANT CHANGE UP
HBV SURFACE AG SER-ACNC: SIGNIFICANT CHANGE UP
HCV AB S/CO SERPL IA: 31.18 S/CO — HIGH
HCV AB SERPL-IMP: REACTIVE
HIV 1+2 AB+HIV1 P24 AG SERPL QL IA: SIGNIFICANT CHANGE UP

## 2021-05-20 PROCEDURE — 99239 HOSP IP/OBS DSCHRG MGMT >30: CPT

## 2021-05-20 RX ORDER — CLONAZEPAM 1 MG
1 TABLET ORAL
Qty: 45 | Refills: 0
Start: 2021-05-20 | End: 2021-06-03

## 2021-05-20 RX ORDER — NICOTINE POLACRILEX 2 MG
1 GUM BUCCAL
Qty: 30 | Refills: 0
Start: 2021-05-20 | End: 2021-06-18

## 2021-05-20 RX ADMIN — Medication 1 MILLIGRAM(S): at 12:58

## 2021-05-20 RX ADMIN — Medication 1 MILLIGRAM(S): at 09:20

## 2021-05-20 RX ADMIN — METHADONE HYDROCHLORIDE 190 MILLIGRAM(S): 40 TABLET ORAL at 09:17

## 2021-05-20 RX ADMIN — Medication 2 MILLIGRAM(S): at 14:38

## 2021-05-20 RX ADMIN — Medication 1 PATCH: at 09:20

## 2021-05-20 RX ADMIN — Medication 1 PATCH: at 10:00

## 2021-05-20 NOTE — PROGRESS NOTE BEHAVIORAL HEALTH - THOUGHT PROCESS
Linear/Impaired reasoning
Impaired reasoning/Disorganized
Linear/Impaired reasoning
Linear/Impaired reasoning

## 2021-05-24 LAB — HCV RNA FLD QL NAA+PROBE: SIGNIFICANT CHANGE UP

## 2021-05-29 PROBLEM — F31.9 BIPOLAR DISORDER, UNSPECIFIED: Chronic | Status: ACTIVE | Noted: 2021-05-15

## 2021-08-22 PROCEDURE — 86769 SARS-COV-2 COVID-19 ANTIBODY: CPT

## 2021-08-22 PROCEDURE — 80061 LIPID PANEL: CPT

## 2021-08-22 PROCEDURE — 86706 HEP B SURFACE ANTIBODY: CPT

## 2021-08-22 PROCEDURE — 87635 SARS-COV-2 COVID-19 AMP PRB: CPT

## 2021-08-22 PROCEDURE — 36415 COLL VENOUS BLD VENIPUNCTURE: CPT

## 2021-08-22 PROCEDURE — 81001 URINALYSIS AUTO W/SCOPE: CPT

## 2021-08-22 PROCEDURE — 80048 BASIC METABOLIC PNL TOTAL CA: CPT

## 2021-08-22 PROCEDURE — 99285 EMERGENCY DEPT VISIT HI MDM: CPT | Mod: 25

## 2021-08-22 PROCEDURE — 87340 HEPATITIS B SURFACE AG IA: CPT

## 2021-08-22 PROCEDURE — 86709 HEPATITIS A IGM ANTIBODY: CPT

## 2021-08-22 PROCEDURE — 85025 COMPLETE CBC W/AUTO DIFF WBC: CPT

## 2021-08-22 PROCEDURE — 86705 HEP B CORE ANTIBODY IGM: CPT

## 2021-08-22 PROCEDURE — 80307 DRUG TEST PRSMV CHEM ANLYZR: CPT

## 2021-08-22 PROCEDURE — 71045 X-RAY EXAM CHEST 1 VIEW: CPT

## 2021-08-22 PROCEDURE — 86704 HEP B CORE ANTIBODY TOTAL: CPT

## 2021-08-22 PROCEDURE — 87521 HEPATITIS C PROBE&RVRS TRNSC: CPT

## 2021-08-22 PROCEDURE — 86803 HEPATITIS C AB TEST: CPT

## 2021-08-22 PROCEDURE — 87389 HIV-1 AG W/HIV-1&-2 AB AG IA: CPT

## 2021-08-22 PROCEDURE — 83036 HEMOGLOBIN GLYCOSYLATED A1C: CPT
